# Patient Record
Sex: MALE | Race: WHITE | NOT HISPANIC OR LATINO | URBAN - METROPOLITAN AREA
[De-identification: names, ages, dates, MRNs, and addresses within clinical notes are randomized per-mention and may not be internally consistent; named-entity substitution may affect disease eponyms.]

---

## 2022-04-12 ENCOUNTER — EMERGENCY (EMERGENCY)
Facility: HOSPITAL | Age: 42
LOS: 1 days | Discharge: HOME | End: 2022-04-12
Admitting: EMERGENCY MEDICINE
Payer: MEDICAID

## 2022-04-12 DIAGNOSIS — M54.50 LOW BACK PAIN, UNSPECIFIED: ICD-10-CM

## 2022-04-12 DIAGNOSIS — M54.32 SCIATICA, LEFT SIDE: ICD-10-CM

## 2022-04-12 PROCEDURE — 99284 EMERGENCY DEPT VISIT MOD MDM: CPT

## 2022-04-20 PROBLEM — Z00.00 ENCOUNTER FOR PREVENTIVE HEALTH EXAMINATION: Status: ACTIVE | Noted: 2022-04-20

## 2022-04-21 ENCOUNTER — APPOINTMENT (OUTPATIENT)
Dept: NEUROSURGERY | Facility: CLINIC | Age: 42
End: 2022-04-21
Payer: SELF-PAY

## 2022-04-21 VITALS
BODY MASS INDEX: 24.38 KG/M2 | HEIGHT: 72 IN | WEIGHT: 180 LBS | TEMPERATURE: 98.4 F | DIASTOLIC BLOOD PRESSURE: 90 MMHG | SYSTOLIC BLOOD PRESSURE: 131 MMHG

## 2022-04-21 DIAGNOSIS — M54.16 RADICULOPATHY, LUMBAR REGION: ICD-10-CM

## 2022-04-21 DIAGNOSIS — F17.210 NICOTINE DEPENDENCE, CIGARETTES, UNCOMPLICATED: ICD-10-CM

## 2022-04-21 DIAGNOSIS — M54.10 RADICULOPATHY, SITE UNSPECIFIED: ICD-10-CM

## 2022-04-21 DIAGNOSIS — Z78.9 OTHER SPECIFIED HEALTH STATUS: ICD-10-CM

## 2022-04-21 PROCEDURE — 99203 OFFICE O/P NEW LOW 30 MIN: CPT

## 2022-04-21 RX ORDER — TRAMADOL HYDROCHLORIDE 50 MG/1
50 TABLET, COATED ORAL 3 TIMES DAILY
Qty: 21 | Refills: 0 | Status: ACTIVE | COMMUNITY
Start: 2022-04-21 | End: 1900-01-01

## 2022-04-21 RX ORDER — METHOCARBAMOL 500 MG/1
500 TABLET, FILM COATED ORAL 3 TIMES DAILY
Qty: 21 | Refills: 0 | Status: ACTIVE | COMMUNITY
Start: 2022-04-21 | End: 1900-01-01

## 2022-04-21 RX ORDER — NAPROXEN 500 MG/1
500 TABLET ORAL
Qty: 60 | Refills: 1 | Status: ACTIVE | COMMUNITY
Start: 2022-04-21 | End: 1900-01-01

## 2022-04-21 RX ORDER — PREDNISONE 20 MG/1
20 TABLET ORAL
Refills: 0 | Status: ACTIVE | COMMUNITY

## 2022-04-21 RX ORDER — OXYCODONE AND ACETAMINOPHEN 10; 325 MG/1; MG/1
10-325 TABLET ORAL
Refills: 0 | Status: ACTIVE | COMMUNITY

## 2022-04-21 NOTE — HISTORY OF PRESENT ILLNESS
[< 3 months] : less than 3 months [FreeTextEntry1] : Low back pain/left lumbar radiculopathy  [de-identified] : 41-year-old male presents to the neurosurgery office with his mother for an initial office visit for evaluation of back pain and left lower extremity radicular symptoms.  The patient was initially seen in the emergency department at [facility] where the patient presented with the above complaints.  He states that he was moving a portable AC unit from a window and noticed a pop in his back resulting in lumbar discomfort and left lower extremity radicular symptoms.  In the emergency department he was given steroids.  Medication with instructions to follow-up in the neurosurgery office.  No imaging was obtained in the emergency department.  The patient states that the medication did not provide much relief and he is in significant amount of discomfort.  He is having difficulty sitting comfortably in the exam chair on today's examination.  He does work as an  but has been out of work for the past few weeks because of his present symptoms.   He notes that the radicular symptoms goes down into his toes. He has no other complaints at present.

## 2022-04-21 NOTE — PHYSICAL EXAM
[General Appearance - Alert] : alert [General Appearance - In No Acute Distress] : in no acute distress [General Appearance - Well Nourished] : well nourished [General Appearance - Well Developed] : well developed [General Appearance - Well-Appearing] : healthy appearing [] : normal voice and communication [Oriented To Time, Place, And Person] : oriented to person, place, and time [Impaired Insight] : insight and judgment were intact [Affect] : the affect was normal [Mood] : the mood was normal [Memory Recent] : recent memory was not impaired [Memory Remote] : remote memory was not impaired [Person] : oriented to person [Place] : oriented to place [Time] : oriented to time [Involuntary Movements] : no involuntary movements were seen [No Muscle Atrophy] : normal bulk in all four extremities [5] : S1 ankle flexors 5/5 [4] : L4/5 ankle dorsiflexors 4/5 [Dysesthesia] : dysesthesia was present [FreeTextEntry7] : Left lower extremity  [FreeTextEntry8] : Patient ambulating unassisted with a slightly limping gait

## 2022-04-21 NOTE — REASON FOR VISIT
[New Patient Visit] : a new patient visit [Referred By: _________] : Patient was referred by JACINTO [FreeTextEntry1] : Lower back pain x 3wks.

## 2022-04-21 NOTE — DATA REVIEWED
[de-identified] : Were reviewed of the lumbar spine - 4/21/2022: No acute fracture/dislocation; good anatomic alignment

## 2022-04-21 NOTE — ASSESSMENT
[FreeTextEntry1] : Discussed the history, physical examination findings, and today's radiographs with the patient and his mother with all questions answered.  Recommend rest and modified activity until the next office visit.  Further evaluation is warranted with an MRI of the lumbar spine which has been ordered today.  Medication sent electronically to the patient's pharmacy.  Lumbar precautions reviewed with the patient along with some literature on the lumbar microdiscectomy procedure.  The patient will follow up after the MRI imaging is complete to discuss further treatment recommendations.

## 2022-05-03 ENCOUNTER — APPOINTMENT (OUTPATIENT)
Dept: MRI IMAGING | Facility: CLINIC | Age: 42
End: 2022-05-03
Payer: SELF-PAY

## 2022-05-03 PROCEDURE — 72148 MRI LUMBAR SPINE W/O DYE: CPT

## 2023-04-19 ENCOUNTER — OFFICE (OUTPATIENT)
Dept: URBAN - METROPOLITAN AREA CLINIC 97 | Facility: CLINIC | Age: 43
Setting detail: OPHTHALMOLOGY
End: 2023-04-19
Payer: COMMERCIAL

## 2023-04-19 DIAGNOSIS — H01.001: ICD-10-CM

## 2023-04-19 DIAGNOSIS — H25.13: ICD-10-CM

## 2023-04-19 DIAGNOSIS — H35.033: ICD-10-CM

## 2023-04-19 DIAGNOSIS — H01.004: ICD-10-CM

## 2023-04-19 DIAGNOSIS — H52.4: ICD-10-CM

## 2023-04-19 DIAGNOSIS — H40.033: ICD-10-CM

## 2023-04-19 DIAGNOSIS — Q82.5: ICD-10-CM

## 2023-04-19 PROCEDURE — 92015 DETERMINE REFRACTIVE STATE: CPT | Performed by: OPHTHALMOLOGY

## 2023-04-19 PROCEDURE — 92020 GONIOSCOPY: CPT | Performed by: OPHTHALMOLOGY

## 2023-04-19 PROCEDURE — 92004 COMPRE OPH EXAM NEW PT 1/>: CPT | Performed by: OPHTHALMOLOGY

## 2023-04-19 ASSESSMENT — REFRACTION_MANIFEST
OD_CYLINDER: +1.00
OD_VA1: 20/40+2
OD_SPHERE: +5.25
OS_AXIS: 160
OS_AXIS: 160
OD_AXIS: 015
OS_CYLINDER: +1.25
OS_SPHERE: +5.50
OS_ADD: +1.50
OD_CYLINDER: +1.00
OD_AXIS: 015
OS_VA1: 20/40+
OS_VA1: 20/40+
OD_ADD: +1.50
OU_VA: 20/40+
OS_CYLINDER: +1.25
OU_VA: 20/40+
OS_SPHERE: +5.50
OD_SPHERE: +5.25
OD_VA1: 20/40+2

## 2023-04-19 ASSESSMENT — REFRACTION_AUTOREFRACTION
OS_AXIS: 159
OD_SPHERE: +6.25
OS_SPHERE: +6.25
OS_CYLINDER: +2.25
OD_CYLINDER: +1.75
OD_AXIS: 013

## 2023-04-19 ASSESSMENT — REFRACTION_CURRENTRX
OD_VPRISM_DIRECTION: SV
OD_OVR_VA: 20/
OD_CYLINDER: +1.50
OS_VPRISM_DIRECTION: SV
OS_SPHERE: +5.00
OS_CYLINDER: +1.25
OS_OVR_VA: 20/
OD_AXIS: 016
OD_SPHERE: +5.25
OS_AXIS: 168

## 2023-04-19 ASSESSMENT — SPHEQUIV_DERIVED
OD_SPHEQUIV: 5.75
OS_SPHEQUIV: 6.125
OS_SPHEQUIV: 6.125
OS_SPHEQUIV: 7.375
OD_SPHEQUIV: 5.75
OD_SPHEQUIV: 7.125

## 2023-04-19 ASSESSMENT — AXIALLENGTH_DERIVED
OD_AL: 21.2439
OD_AL: 21.2439
OS_AL: 21.163
OD_AL: 20.82
OS_AL: 20.78
OS_AL: 21.163

## 2023-04-19 ASSESSMENT — VISUAL ACUITY
OS_BCVA: 20/40-2
OD_BCVA: 20/40-2

## 2023-04-19 ASSESSMENT — CONFRONTATIONAL VISUAL FIELD TEST (CVF)
OS_FINDINGS: FULL
OD_FINDINGS: FULL

## 2023-04-19 ASSESSMENT — KERATOMETRY
OD_K1POWER_DIOPTERS: 44.00
OD_K2POWER_DIOPTERS: 45.00
OS_K2POWER_DIOPTERS: 45.00
OS_AXISANGLE_DEGREES: 158
OS_K1POWER_DIOPTERS: 43.75
OD_AXISANGLE_DEGREES: 178

## 2023-04-19 ASSESSMENT — TONOMETRY
OS_IOP_MMHG: 18
OD_IOP_MMHG: 18

## 2023-04-19 ASSESSMENT — LID EXAM ASSESSMENTS
OD_BLEPHARITIS: RUL 1+
OS_BLEPHARITIS: LUL 1+

## 2023-10-18 ENCOUNTER — OFFICE (OUTPATIENT)
Dept: URBAN - METROPOLITAN AREA CLINIC 97 | Facility: CLINIC | Age: 43
Setting detail: OPHTHALMOLOGY
End: 2023-10-18
Payer: COMMERCIAL

## 2023-10-18 DIAGNOSIS — H40.033: ICD-10-CM

## 2023-10-18 DIAGNOSIS — H01.001: ICD-10-CM

## 2023-10-18 DIAGNOSIS — Q82.5: ICD-10-CM

## 2023-10-18 DIAGNOSIS — H25.13: ICD-10-CM

## 2023-10-18 DIAGNOSIS — H01.004: ICD-10-CM

## 2023-10-18 PROCEDURE — 99213 OFFICE O/P EST LOW 20 MIN: CPT | Performed by: OPHTHALMOLOGY

## 2023-10-18 ASSESSMENT — KERATOMETRY
OD_K1POWER_DIOPTERS: 44.00
OD_AXISANGLE_DEGREES: 178
OS_AXISANGLE_DEGREES: 158
OS_K2POWER_DIOPTERS: 45.00
OS_K1POWER_DIOPTERS: 43.75
OD_K2POWER_DIOPTERS: 45.00

## 2023-10-18 ASSESSMENT — REFRACTION_MANIFEST
OS_SPHERE: +5.50
OD_SPHERE: +5.25
OD_CYLINDER: +1.00
OS_ADD: +1.50
OD_SPHERE: +5.25
OS_AXIS: 160
OS_CYLINDER: +1.25
OD_AXIS: 015
OD_AXIS: 015
OU_VA: 20/40+
OD_VA1: 20/40+2
OS_SPHERE: +5.50
OS_VA1: 20/40+
OU_VA: 20/40+
OD_CYLINDER: +1.00
OS_AXIS: 160
OS_CYLINDER: +1.25
OS_VA1: 20/40+
OD_ADD: +1.50
OD_VA1: 20/40+2

## 2023-10-18 ASSESSMENT — REFRACTION_CURRENTRX
OS_SPHERE: +5.00
OD_VPRISM_DIRECTION: SV
OS_AXIS: 168
OD_OVR_VA: 20/
OS_OVR_VA: 20/
OD_AXIS: 016
OS_CYLINDER: +1.25
OS_VPRISM_DIRECTION: SV
OD_CYLINDER: +1.50
OD_SPHERE: +5.25

## 2023-10-18 ASSESSMENT — TONOMETRY
OS_IOP_MMHG: 15
OD_IOP_MMHG: 15

## 2023-10-18 ASSESSMENT — SPHEQUIV_DERIVED
OS_SPHEQUIV: 6.125
OD_SPHEQUIV: 5.75
OS_SPHEQUIV: 7.375
OS_SPHEQUIV: 6.125
OD_SPHEQUIV: 7.125
OD_SPHEQUIV: 5.75

## 2023-10-18 ASSESSMENT — LID EXAM ASSESSMENTS
OD_BLEPHARITIS: RUL 1+
OS_BLEPHARITIS: LUL 1+

## 2023-10-18 ASSESSMENT — REFRACTION_AUTOREFRACTION
OS_AXIS: 159
OS_SPHERE: +6.25
OS_CYLINDER: +2.25
OD_AXIS: 013
OD_CYLINDER: +1.75
OD_SPHERE: +6.25

## 2023-10-18 ASSESSMENT — VISUAL ACUITY
OS_BCVA: 20/40-2
OD_BCVA: 20/50

## 2023-10-18 ASSESSMENT — AXIALLENGTH_DERIVED
OD_AL: 20.82
OS_AL: 20.78
OS_AL: 21.163
OS_AL: 21.163
OD_AL: 21.2439
OD_AL: 21.2439

## 2023-10-18 ASSESSMENT — CONFRONTATIONAL VISUAL FIELD TEST (CVF)
OD_FINDINGS: FULL
OS_FINDINGS: FULL

## 2023-11-13 ENCOUNTER — OFFICE (OUTPATIENT)
Dept: URBAN - METROPOLITAN AREA CLINIC 97 | Facility: CLINIC | Age: 43
Setting detail: OPHTHALMOLOGY
End: 2023-11-13
Payer: COMMERCIAL

## 2023-11-13 DIAGNOSIS — H40.033: ICD-10-CM

## 2023-11-13 PROCEDURE — 66761 REVISION OF IRIS: CPT | Mod: LT | Performed by: OPHTHALMOLOGY

## 2023-11-13 ASSESSMENT — REFRACTION_MANIFEST
OS_CYLINDER: +1.25
OD_SPHERE: +5.25
OS_AXIS: 160
OS_VA1: 20/40+
OD_AXIS: 015
OD_VA1: 20/40+2
OD_AXIS: 015
OS_VA1: 20/40+
OU_VA: 20/40+
OU_VA: 20/40+
OD_VA1: 20/40+2
OS_AXIS: 160
OS_SPHERE: +5.50
OS_CYLINDER: +1.25
OD_CYLINDER: +1.00
OD_SPHERE: +5.25
OS_ADD: +1.50
OD_CYLINDER: +1.00
OD_ADD: +1.50
OS_SPHERE: +5.50

## 2023-11-13 ASSESSMENT — REFRACTION_CURRENTRX
OD_SPHERE: +5.25
OD_AXIS: 016
OS_SPHERE: +5.00
OS_OVR_VA: 20/
OS_AXIS: 168
OS_VPRISM_DIRECTION: SV
OD_CYLINDER: +1.50
OD_VPRISM_DIRECTION: SV
OS_CYLINDER: +1.25
OD_OVR_VA: 20/

## 2023-11-13 ASSESSMENT — LID EXAM ASSESSMENTS
OS_BLEPHARITIS: LUL 1+
OD_BLEPHARITIS: RUL 1+

## 2023-11-13 ASSESSMENT — SPHEQUIV_DERIVED
OS_SPHEQUIV: 7.375
OD_SPHEQUIV: 7.125
OS_SPHEQUIV: 6.125
OD_SPHEQUIV: 5.75
OS_SPHEQUIV: 6.125
OD_SPHEQUIV: 5.75

## 2023-11-13 ASSESSMENT — REFRACTION_AUTOREFRACTION
OS_CYLINDER: +2.25
OD_SPHERE: +6.25
OS_SPHERE: +6.25
OS_AXIS: 159
OD_CYLINDER: +1.75
OD_AXIS: 013

## 2023-11-13 ASSESSMENT — CONFRONTATIONAL VISUAL FIELD TEST (CVF)
OD_FINDINGS: FULL
OS_FINDINGS: FULL

## 2023-11-22 ENCOUNTER — OFFICE (OUTPATIENT)
Dept: URBAN - METROPOLITAN AREA CLINIC 97 | Facility: CLINIC | Age: 43
Setting detail: OPHTHALMOLOGY
End: 2023-11-22
Payer: COMMERCIAL

## 2023-11-22 DIAGNOSIS — H40.033: ICD-10-CM

## 2023-11-22 PROCEDURE — 66761 REVISION OF IRIS: CPT | Mod: RT | Performed by: OPHTHALMOLOGY

## 2023-11-22 ASSESSMENT — LID EXAM ASSESSMENTS
OS_BLEPHARITIS: LUL 1+
OD_BLEPHARITIS: RUL 1+

## 2023-11-22 ASSESSMENT — REFRACTION_CURRENTRX
OS_SPHERE: +5.00
OS_VPRISM_DIRECTION: SV
OS_OVR_VA: 20/
OD_CYLINDER: +1.50
OD_OVR_VA: 20/
OD_SPHERE: +5.25
OS_AXIS: 168
OD_AXIS: 016
OS_CYLINDER: +1.25
OD_VPRISM_DIRECTION: SV

## 2023-11-22 ASSESSMENT — REFRACTION_MANIFEST
OS_AXIS: 160
OS_CYLINDER: +1.25
OD_SPHERE: +5.25
OD_SPHERE: +5.25
OS_CYLINDER: +1.25
OD_VA1: 20/40+2
OD_AXIS: 015
OU_VA: 20/40+
OS_VA1: 20/40+
OS_VA1: 20/40+
OD_ADD: +1.50
OD_CYLINDER: +1.00
OU_VA: 20/40+
OD_AXIS: 015
OS_SPHERE: +5.50
OS_ADD: +1.50
OS_SPHERE: +5.50
OD_CYLINDER: +1.00
OD_VA1: 20/40+2
OS_AXIS: 160

## 2023-11-22 ASSESSMENT — REFRACTION_AUTOREFRACTION
OS_SPHERE: +6.25
OS_AXIS: 159
OD_CYLINDER: +1.75
OD_SPHERE: +6.25
OD_AXIS: 013
OS_CYLINDER: +2.25

## 2023-11-22 ASSESSMENT — SPHEQUIV_DERIVED
OS_SPHEQUIV: 6.125
OD_SPHEQUIV: 5.75
OD_SPHEQUIV: 7.125
OS_SPHEQUIV: 7.375
OD_SPHEQUIV: 5.75
OS_SPHEQUIV: 6.125

## 2023-11-22 ASSESSMENT — CONFRONTATIONAL VISUAL FIELD TEST (CVF)
OD_FINDINGS: FULL
OS_FINDINGS: FULL